# Patient Record
Sex: MALE | Race: BLACK OR AFRICAN AMERICAN | NOT HISPANIC OR LATINO | Employment: FULL TIME | RURAL
[De-identification: names, ages, dates, MRNs, and addresses within clinical notes are randomized per-mention and may not be internally consistent; named-entity substitution may affect disease eponyms.]

---

## 2022-06-01 ENCOUNTER — HOSPITAL ENCOUNTER (EMERGENCY)
Facility: HOSPITAL | Age: 22
Discharge: HOME | End: 2022-06-01
Payer: MEDICAID

## 2022-06-01 VITALS
BODY MASS INDEX: 20.9 KG/M2 | HEART RATE: 62 BPM | TEMPERATURE: 98 F | WEIGHT: 146 LBS | DIASTOLIC BLOOD PRESSURE: 67 MMHG | RESPIRATION RATE: 18 BRPM | HEIGHT: 70 IN | SYSTOLIC BLOOD PRESSURE: 128 MMHG | OXYGEN SATURATION: 100 %

## 2022-06-01 DIAGNOSIS — R51.9 ACUTE NONINTRACTABLE HEADACHE, UNSPECIFIED HEADACHE TYPE: Primary | ICD-10-CM

## 2022-06-01 PROCEDURE — 96372 THER/PROPH/DIAG INJ SC/IM: CPT

## 2022-06-01 PROCEDURE — 99284 EMERGENCY DEPT VISIT MOD MDM: CPT

## 2022-06-01 PROCEDURE — 99283 EMERGENCY DEPT VISIT LOW MDM: CPT | Mod: ,,, | Performed by: NURSE PRACTITIONER

## 2022-06-01 PROCEDURE — 99283 PR EMERGENCY DEPT VISIT,LEVEL III: ICD-10-PCS | Mod: ,,, | Performed by: NURSE PRACTITIONER

## 2022-06-01 PROCEDURE — 63600175 PHARM REV CODE 636 W HCPCS: Performed by: NURSE PRACTITIONER

## 2022-06-01 RX ORDER — KETOROLAC TROMETHAMINE 30 MG/ML
30 INJECTION, SOLUTION INTRAMUSCULAR; INTRAVENOUS
Status: COMPLETED | OUTPATIENT
Start: 2022-06-01 | End: 2022-06-01

## 2022-06-01 RX ADMIN — KETOROLAC TROMETHAMINE 30 MG: 30 INJECTION, SOLUTION INTRAMUSCULAR at 06:06

## 2022-06-01 NOTE — DISCHARGE INSTRUCTIONS
Follow up with primary care provider in 1-2 days.   Ibuprofen 600 mg every 8 hours as needed for headache/pain.  Tylenol 1-2 tablets every 4-6 hours as needed for pain.  Return to emergency department for any new or worsening symptoms.

## 2022-06-01 NOTE — ED TRIAGE NOTES
Presents to ed with friend c/o headache since yesterday stated it has gotten worse today..Has not taken any otc medication.Rates 8 on 10 pain scale.

## 2022-06-01 NOTE — ED PROVIDER NOTES
Encounter Date: 6/1/2022       History     Chief Complaint   Patient presents with    Headache     Tejas Vizcarra is a 21 y.o. Black or  /male presenting to ED with headache for 2 days. Reports that this is a typical headache for him. Pain is bilateral, above eyes. Took tylenol yesterday with some relief but has not taken any OTC meds for headache today. Denies visual disturbances. Denies N/V, fever. Denies head injury or recent trauma. Currently in NAD. VSS at this time.         Review of patient's allergies indicates:  No Known Allergies  History reviewed. No pertinent past medical history.  History reviewed. No pertinent surgical history.  History reviewed. No pertinent family history.  Social History     Tobacco Use    Smoking status: Current Every Day Smoker     Packs/day: 0.50     Types: Cigarettes    Smokeless tobacco: Never Used   Substance Use Topics    Alcohol use: Not Currently    Drug use: Not Currently     Review of Systems   Constitutional: Negative for activity change, chills, fatigue and fever.   HENT: Negative for congestion, ear discharge, rhinorrhea, sinus pressure, sinus pain and sore throat.    Eyes: Negative for photophobia, pain and visual disturbance.   Gastrointestinal: Negative for nausea and vomiting.   Musculoskeletal: Negative for neck pain and neck stiffness.   Neurological: Positive for headaches. Negative for dizziness, syncope, facial asymmetry, speech difficulty, weakness, light-headedness and numbness.   Psychiatric/Behavioral: Negative for confusion.   All other systems reviewed and are negative.      Physical Exam     Initial Vitals [06/01/22 1842]   BP Pulse Resp Temp SpO2   128/67 62 18 98.3 °F (36.8 °C) 100 %      MAP       --         Physical Exam    Nursing note and vitals reviewed.  Constitutional: He appears well-developed and well-nourished. No distress.   HENT:   Head: Normocephalic and atraumatic.   Nose: Nose normal.   Mouth/Throat:  Oropharynx is clear and moist.   Eyes: Conjunctivae and EOM are normal. Pupils are equal, round, and reactive to light. No scleral icterus.   Neck: Neck supple.   Normal range of motion.  Cardiovascular: Normal rate and regular rhythm.   Pulmonary/Chest: Breath sounds normal. No respiratory distress.   Musculoskeletal:         General: No tenderness. Normal range of motion.      Cervical back: Normal range of motion and neck supple.     Lymphadenopathy:     He has no cervical adenopathy.   Neurological: He is alert and oriented to person, place, and time. He has normal strength.   Skin: Skin is warm and dry.   Psychiatric: He has a normal mood and affect.         Medical Screening Exam   See Full Note    ED Course   Procedures  Labs Reviewed - No data to display       Imaging Results    None          Medications   ketorolac injection 30 mg (30 mg Intramuscular Given 6/1/22 1850)                 ED Course as of 06/01/22 1918 Wed Jun 01, 2022 1915 Reports headache is much better.   [CG]      ED Course User Index  [CG] ISMAEL Hanks          Clinical Impression:   Final diagnoses:  [R51.9] Acute nonintractable headache, unspecified headache type (Primary)          ED Disposition Condition    Discharge Stable        ED Prescriptions     None        Follow-up Information    None          ISMAEL Hanks  06/01/22 1918

## 2022-06-18 ENCOUNTER — HOSPITAL ENCOUNTER (EMERGENCY)
Facility: HOSPITAL | Age: 22
Discharge: HOME | End: 2022-06-18
Payer: MEDICAID

## 2022-06-18 VITALS
RESPIRATION RATE: 14 BRPM | SYSTOLIC BLOOD PRESSURE: 128 MMHG | HEART RATE: 69 BPM | WEIGHT: 137.38 LBS | TEMPERATURE: 99 F | HEIGHT: 70 IN | BODY MASS INDEX: 19.67 KG/M2 | DIASTOLIC BLOOD PRESSURE: 80 MMHG | OXYGEN SATURATION: 99 %

## 2022-06-18 DIAGNOSIS — S02.5XXB OPEN FRACTURE OF TOOTH, INITIAL ENCOUNTER: ICD-10-CM

## 2022-06-18 DIAGNOSIS — K04.01 ACUTE PULPITIS: Primary | ICD-10-CM

## 2022-06-18 DIAGNOSIS — K08.89 PAIN, DENTAL: ICD-10-CM

## 2022-06-18 DIAGNOSIS — R03.0 ELEVATED BLOOD PRESSURE READING IN OFFICE WITHOUT DIAGNOSIS OF HYPERTENSION: ICD-10-CM

## 2022-06-18 PROCEDURE — 99284 EMERGENCY DEPT VISIT MOD MDM: CPT

## 2022-06-18 PROCEDURE — 99283 EMERGENCY DEPT VISIT LOW MDM: CPT | Mod: ,,, | Performed by: NURSE PRACTITIONER

## 2022-06-18 PROCEDURE — 96372 THER/PROPH/DIAG INJ SC/IM: CPT

## 2022-06-18 PROCEDURE — 63600175 PHARM REV CODE 636 W HCPCS: Performed by: NURSE PRACTITIONER

## 2022-06-18 PROCEDURE — 99283 PR EMERGENCY DEPT VISIT,LEVEL III: ICD-10-PCS | Mod: ,,, | Performed by: NURSE PRACTITIONER

## 2022-06-18 RX ORDER — PENICILLIN V POTASSIUM 500 MG/1
500 TABLET, FILM COATED ORAL EVERY 8 HOURS
Qty: 21 TABLET | Refills: 0 | Status: SHIPPED | OUTPATIENT
Start: 2022-06-18 | End: 2022-06-25

## 2022-06-18 RX ORDER — NAPROXEN 500 MG/1
500 TABLET ORAL 2 TIMES DAILY PRN
Qty: 30 TABLET | Refills: 0 | OUTPATIENT
Start: 2022-06-18 | End: 2022-08-29

## 2022-06-18 RX ORDER — KETOROLAC TROMETHAMINE 30 MG/ML
30 INJECTION, SOLUTION INTRAMUSCULAR; INTRAVENOUS
Status: COMPLETED | OUTPATIENT
Start: 2022-06-18 | End: 2022-06-18

## 2022-06-18 RX ORDER — CEFTRIAXONE 1 G/1
1 INJECTION, POWDER, FOR SOLUTION INTRAMUSCULAR; INTRAVENOUS
Status: COMPLETED | OUTPATIENT
Start: 2022-06-18 | End: 2022-06-18

## 2022-06-18 RX ADMIN — CEFTRIAXONE SODIUM 1 G: 1 INJECTION, POWDER, FOR SOLUTION INTRAMUSCULAR; INTRAVENOUS at 07:06

## 2022-06-18 RX ADMIN — KETOROLAC TROMETHAMINE 30 MG: 30 INJECTION, SOLUTION INTRAMUSCULAR at 07:06

## 2022-06-19 NOTE — ED TRIAGE NOTES
Patient came in c/o left lower jaw tooth pain that he has had a while but has increased over past 2 days. Patient denies any trauma. Patient has not seen a dentist. Patient has not been on any antibiotics. Patient's friend brought him and and is present at bedside.

## 2022-06-19 NOTE — ED PROVIDER NOTES
Encounter Date: 6/18/2022       History     Chief Complaint   Patient presents with    Dental Pain     Tejas Vizcarra is a 22 yo AAM who presents with left lower dental pain. Chronic but tooth has started breaking into pieces and pain is getting worse        Review of patient's allergies indicates:  No Known Allergies  History reviewed. No pertinent past medical history.  History reviewed. No pertinent surgical history.  History reviewed. No pertinent family history.  Social History     Tobacco Use    Smoking status: Current Every Day Smoker     Packs/day: 0.50     Types: Cigarettes    Smokeless tobacco: Never Used   Substance Use Topics    Alcohol use: Not Currently    Drug use: Not Currently     Review of Systems   Constitutional: Negative for chills and fever.   HENT: Positive for dental problem and facial swelling. Negative for ear pain, hearing loss, sinus pressure and sinus pain.    Respiratory: Negative for cough, shortness of breath and wheezing.    Neurological: Negative for dizziness and headaches.   Psychiatric/Behavioral: Negative for confusion.   All other systems reviewed and are negative.      Physical Exam     Initial Vitals [06/18/22 1940]   BP Pulse Resp Temp SpO2   132/87 64 12 98.6 °F (37 °C) 99 %      MAP       --         Physical Exam    Nursing note and vitals reviewed.  Constitutional: He appears well-developed and well-nourished.   HENT:   Head: Normocephalic and atraumatic.   Right Ear: External ear normal.   Left Ear: External ear normal.   Nose: Nose normal.   Mouth/Throat: Oropharynx is clear and moist. Dental abscesses and dental caries present.       Eyes: EOM are normal. Pupils are equal, round, and reactive to light.   Neck: Neck supple.   Normal range of motion.  Cardiovascular: Normal rate, regular rhythm and normal heart sounds.   Pulmonary/Chest: Breath sounds normal.   Musculoskeletal:         General: Normal range of motion.      Cervical back: Normal range of  motion and neck supple.     Neurological: He is alert and oriented to person, place, and time. GCS score is 15. GCS eye subscore is 4. GCS verbal subscore is 5. GCS motor subscore is 6.   Skin: Skin is warm.   Psychiatric: He has a normal mood and affect. His behavior is normal. Judgment and thought content normal.         Medical Screening Exam   See Full Note    ED Course   Procedures  Labs Reviewed - No data to display       Imaging Results    None          Medications   cefTRIAXone injection 1 g (has no administration in time range)   ketorolac injection 30 mg (has no administration in time range)                       Clinical Impression:   Final diagnoses:  [K08.89] Pain, dental  [K04.01] Acute pulpitis (Primary)  [S02.5XXB] Open fracture of tooth, initial encounter  [R03.0] Elevated blood pressure reading in office without diagnosis of hypertension          ED Disposition Condition    Discharge Stable        ED Prescriptions     Medication Sig Dispense Start Date End Date Auth. Provider    penicillin v potassium (VEETID) 500 MG tablet Take 1 tablet (500 mg total) by mouth every 8 (eight) hours. for 7 days 21 tablet 6/18/2022 6/25/2022 ISMAEL Mcclendon    naproxen (NAPROSYN) 500 MG tablet Take 1 tablet (500 mg total) by mouth 2 (two) times daily as needed (pain). 30 tablet 6/18/2022  ISMAEL Mcclendon        Follow-up Information    None          ISMAEL Mcclendon  06/18/22 1951

## 2022-06-19 NOTE — DISCHARGE INSTRUCTIONS
Finish your antibiotics, make appointment with dentist asap! Thank you for choosing [facility] for your healthcare needs.

## 2022-08-29 ENCOUNTER — HOSPITAL ENCOUNTER (EMERGENCY)
Facility: HOSPITAL | Age: 22
Discharge: HOME | End: 2022-08-29
Payer: MEDICAID

## 2022-08-29 VITALS
TEMPERATURE: 98 F | WEIGHT: 144.13 LBS | SYSTOLIC BLOOD PRESSURE: 124 MMHG | DIASTOLIC BLOOD PRESSURE: 62 MMHG | BODY MASS INDEX: 20.64 KG/M2 | HEART RATE: 82 BPM | RESPIRATION RATE: 18 BRPM | OXYGEN SATURATION: 100 % | HEIGHT: 70 IN

## 2022-08-29 DIAGNOSIS — U07.1 COVID-19: Primary | ICD-10-CM

## 2022-08-29 LAB
FLUAV AG UPPER RESP QL IA.RAPID: NEGATIVE
FLUBV AG UPPER RESP QL IA.RAPID: NEGATIVE
SARS-COV+SARS-COV-2 AG RESP QL IA.RAPID: POSITIVE

## 2022-08-29 PROCEDURE — 99283 PR EMERGENCY DEPT VISIT,LEVEL III: ICD-10-PCS | Mod: ,,, | Performed by: NURSE PRACTITIONER

## 2022-08-29 PROCEDURE — 99283 EMERGENCY DEPT VISIT LOW MDM: CPT

## 2022-08-29 PROCEDURE — 87428 SARSCOV & INF VIR A&B AG IA: CPT | Performed by: NURSE PRACTITIONER

## 2022-08-29 PROCEDURE — 99283 EMERGENCY DEPT VISIT LOW MDM: CPT | Mod: ,,, | Performed by: NURSE PRACTITIONER

## 2022-08-29 RX ORDER — BENZONATATE 100 MG/1
100 CAPSULE ORAL 3 TIMES DAILY PRN
Qty: 20 CAPSULE | Refills: 0 | Status: SHIPPED | OUTPATIENT
Start: 2022-08-29 | End: 2022-09-08

## 2022-08-29 NOTE — DISCHARGE INSTRUCTIONS
Take medication as directed.  Follow up with your primary care provider or [facility].  Return for shortness of breath or not able to tolerate fluids by mouth.  Other wise stay home on isolation for the next 5 days.  Take tylenol or ibuporfen as directed if needed for fever or body aches

## 2022-08-29 NOTE — ED TRIAGE NOTES
"21 year old male presents to ER with c/o headache, stuffy nose, cough, cold sores in nose x 2 days. Pt states "they made me come from work to be checked".  Skin warm/dry to touch, respirations are even and non labored. Pts VS are WNL with NAD noted at this time.  "

## 2022-08-29 NOTE — ED PROVIDER NOTES
Encounter Date: 8/29/2022       History     Chief Complaint   Patient presents with    Cough    Nausea     21 yr old AAM to ED with c/o cough, nausea, aching all over for the past 2 days.      The history is provided by the patient.   URI  The primary symptoms include cough and nausea. Primary symptoms do not include fever or wheezing. The current episode started two days ago. This is a new problem.   The cough is productive. The sputum is clear.   Nausea began yesterday. Associated with: cough.   Symptoms associated with the illness include congestion.   Review of patient's allergies indicates:  No Known Allergies  History reviewed. No pertinent past medical history.  History reviewed. No pertinent surgical history.  History reviewed. No pertinent family history.  Social History     Tobacco Use    Smoking status: Every Day     Packs/day: 0.50     Types: Cigarettes    Smokeless tobacco: Never   Substance Use Topics    Alcohol use: Not Currently    Drug use: Not Currently     Review of Systems   Constitutional:  Negative for fever.   HENT:  Positive for congestion and postnasal drip.    Respiratory:  Positive for cough. Negative for shortness of breath and wheezing.    Cardiovascular:  Negative for chest pain.   Gastrointestinal:  Positive for nausea.   Genitourinary: Negative.    Musculoskeletal: Negative.    Skin: Negative.      Physical Exam     Initial Vitals [08/29/22 0335]   BP Pulse Resp Temp SpO2   124/62 82 18 98.4 °F (36.9 °C) 100 %      MAP       --         Physical Exam    Nursing note and vitals reviewed.  Constitutional: He appears well-developed and well-nourished.   Neck: Neck supple.   Cardiovascular:  Normal rate and regular rhythm.           Pulmonary/Chest: Breath sounds normal.   Musculoskeletal:      Cervical back: Neck supple.     Neurological: He is alert and oriented to person, place, and time.   Skin: Skin is warm and dry. Capillary refill takes less than 2 seconds.       Medical Screening  Exam   See Full Note    ED Course   Procedures  Labs Reviewed   SARS-COV2 (COVID) W/ FLU ANTIGEN - Abnormal; Notable for the following components:       Result Value    COVID-19 Ag Positive (*)     All other components within normal limits    Narrative:     Positive SARS-CoV antigen results indicate the presence of viral antigens; correlation with patient history and presence of clinical signs & symptoms consistent with COVID-19 are necessary to determine infection status.          Imaging Results    None          Medications - No data to display              ED Course as of 08/29/22 0414   Mon Aug 29, 2022   0413 Discussed findings, return precautions and f/u with patient [CG]      ED Course User Index  [CG] ISMAEL Hanks          Clinical Impression:   Final diagnoses:  [U07.1] COVID-19 (Primary)      ED Disposition Condition    Discharge Stable          ED Prescriptions       Medication Sig Dispense Start Date End Date Auth. Provider    benzonatate (TESSALON) 100 MG capsule Take 1 capsule (100 mg total) by mouth 3 (three) times daily as needed for Cough. 20 capsule 8/29/2022 9/8/2022 ISMAEL Hanks          Follow-up Information       Follow up With Specialties Details Why Contact Info    [facility]   [address] [place] [address] [place] [id number] [phone]               ISMAEL Hanks  08/29/22 0414

## 2022-08-29 NOTE — Clinical Note
"Liquandetricus "Girishandetric" Zackery was seen and treated in our emergency department on 8/29/2022.  He may return to work on 09/04/2022.       If you have any questions or concerns, please don't hesitate to call.      ISMAEL Hanks"

## 2023-01-06 ENCOUNTER — HOSPITAL ENCOUNTER (EMERGENCY)
Facility: HOSPITAL | Age: 23
Discharge: HOME | End: 2023-01-06
Payer: MEDICAID

## 2023-01-06 VITALS
TEMPERATURE: 98 F | OXYGEN SATURATION: 100 % | DIASTOLIC BLOOD PRESSURE: 64 MMHG | RESPIRATION RATE: 18 BRPM | HEART RATE: 61 BPM | SYSTOLIC BLOOD PRESSURE: 115 MMHG | HEIGHT: 70 IN | WEIGHT: 146 LBS | BODY MASS INDEX: 20.9 KG/M2

## 2023-01-06 DIAGNOSIS — T78.1XXA ALLERGIC REACTION TO FOOD, INITIAL ENCOUNTER: Primary | ICD-10-CM

## 2023-01-06 DIAGNOSIS — R21 RASH: ICD-10-CM

## 2023-01-06 PROCEDURE — 99283 PR EMERGENCY DEPT VISIT,LEVEL III: ICD-10-PCS | Mod: ,,, | Performed by: NURSE PRACTITIONER

## 2023-01-06 PROCEDURE — 99283 EMERGENCY DEPT VISIT LOW MDM: CPT | Mod: ,,, | Performed by: NURSE PRACTITIONER

## 2023-01-06 PROCEDURE — 96372 THER/PROPH/DIAG INJ SC/IM: CPT | Performed by: NURSE PRACTITIONER

## 2023-01-06 PROCEDURE — 63600175 PHARM REV CODE 636 W HCPCS: Performed by: NURSE PRACTITIONER

## 2023-01-06 PROCEDURE — 99284 EMERGENCY DEPT VISIT MOD MDM: CPT

## 2023-01-06 RX ORDER — DEXAMETHASONE SODIUM PHOSPHATE 4 MG/ML
8 INJECTION, SOLUTION INTRA-ARTICULAR; INTRALESIONAL; INTRAMUSCULAR; INTRAVENOUS; SOFT TISSUE
Status: COMPLETED | OUTPATIENT
Start: 2023-01-06 | End: 2023-01-06

## 2023-01-06 RX ORDER — CETIRIZINE HYDROCHLORIDE 10 MG/1
10 TABLET ORAL DAILY
Qty: 30 TABLET | Refills: 0 | Status: SHIPPED | OUTPATIENT
Start: 2023-01-06 | End: 2024-01-06

## 2023-01-06 RX ORDER — PREDNISONE 20 MG/1
40 TABLET ORAL DAILY
Qty: 6 TABLET | Refills: 0 | Status: SHIPPED | OUTPATIENT
Start: 2023-01-06 | End: 2023-01-09

## 2023-01-06 RX ADMIN — DEXAMETHASONE SODIUM PHOSPHATE 8 MG: 4 INJECTION, SOLUTION INTRA-ARTICULAR; INTRALESIONAL; INTRAMUSCULAR; INTRAVENOUS; SOFT TISSUE at 07:01

## 2023-01-06 NOTE — ED PROVIDER NOTES
Encounter Date: 1/6/2023       History     Chief Complaint   Patient presents with    Allergic Reaction     23 y/o AAM presents pov with c/o an allergic reaction to seafood after eating fish and seafood yesterday at 14:00. Patient c/o rash and tingling around his mouth.    Review of patient's allergies indicates:   Allergen Reactions    Iodine Swelling    Shrimp      History reviewed. No pertinent past medical history.  History reviewed. No pertinent surgical history.  History reviewed. No pertinent family history.  Social History     Tobacco Use    Smoking status: Every Day     Packs/day: 0.50     Types: Cigarettes    Smokeless tobacco: Never   Substance Use Topics    Alcohol use: Not Currently    Drug use: Not Currently     Review of Systems   HENT:  Negative for facial swelling, rhinorrhea, sneezing, sore throat and trouble swallowing.    Eyes:  Negative for redness, itching and visual disturbance.   Respiratory:  Negative for apnea, cough, choking, chest tightness, shortness of breath, wheezing and stridor.    Cardiovascular:  Negative for chest pain, palpitations and leg swelling.   Gastrointestinal:  Negative for abdominal pain, nausea and vomiting.   Allergic/Immunologic: Positive for food allergies.   All other systems reviewed and are negative.    Physical Exam     Initial Vitals [01/06/23 0713]   BP Pulse Resp Temp SpO2   115/64 61 18 98.1 °F (36.7 °C) 100 %      MAP       --         Physical Exam    Nursing note and vitals reviewed.  Constitutional: He appears well-developed and well-nourished. No distress.   HENT:   Head: Normocephalic.   Right Ear: Tympanic membrane and ear canal normal.   Left Ear: Tympanic membrane and ear canal normal.   Nose: Nose normal. No mucosal edema. Right sinus exhibits no maxillary sinus tenderness and no frontal sinus tenderness. Left sinus exhibits no maxillary sinus tenderness and no frontal sinus tenderness.   Mouth/Throat: Uvula is midline, oropharynx is clear and moist  and mucous membranes are normal. No uvula swelling.   Eyes: Conjunctivae and EOM are normal.   Neck: Neck supple.   Normal range of motion.  Cardiovascular:  Normal rate, regular rhythm, normal heart sounds and intact distal pulses.     Exam reveals no gallop and no friction rub.       No murmur heard.  Pulmonary/Chest: Breath sounds normal. No respiratory distress. He has no wheezes. He has no rhonchi. He has no rales. He exhibits no tenderness.   Abdominal: Abdomen is soft. Bowel sounds are normal. There is no abdominal tenderness.   Musculoskeletal:         General: Normal range of motion.      Cervical back: Normal range of motion and neck supple.     Lymphadenopathy:     He has no cervical adenopathy.   Neurological: He is alert and oriented to person, place, and time. He has normal strength.   Skin: Skin is warm and dry. Capillary refill takes less than 2 seconds.   Psychiatric: He has a normal mood and affect. His behavior is normal. Judgment and thought content normal.       Medical Screening Exam   See Full Note    ED Course   Procedures  Labs Reviewed - No data to display       Imaging Results    None          Medications   dexAMETHasone injection 8 mg (8 mg Intramuscular Given 1/6/23 0735)     Medical Decision Making:   Initial Assessment:   21 y/o AAM presents pov with c/o an allergic reaction to seafood after eating fish and seafood yesterday at 14:00. Patient c/o rash and tingling around his mouth. Heart sounds normal with regular rate/rhythm. Lungs clear to auscultation. Resp even/unlabored. Skin w/d. Mild erythema around mouth.  Differential Diagnosis:   Anaphylactic Reaction  Allergic Reaction  Skin Rash  ED Management:  Patient supine on stretcher in ED # 1 in [facility]. IM steroid given. Patient discharged home. Return precautions and f/u instructions given.                 Clinical Impression:   Final diagnoses:  [T78.1XXA] Allergic reaction to food, initial encounter (Primary)  [R21] Rash        ED  Disposition Condition    Discharge Stable          ED Prescriptions       Medication Sig Dispense Start Date End Date Auth. Provider    predniSONE (DELTASONE) 20 MG tablet Take 2 tablets (40 mg total) by mouth once daily. for 3 days 6 tablet 1/6/2023 1/9/2023 ISMAEL Vieyra    cetirizine (ZYRTEC) 10 MG tablet Take 1 tablet (10 mg total) by mouth once daily. 30 tablet 1/6/2023 1/6/2024 ISMAEL Vieyra          Follow-up Information    None          ISMAEL Vieyra  01/06/23 0750

## 2023-01-06 NOTE — ED TRIAGE NOTES
Pt presents w/ c/o allergy reaction to seafood that he ate at 2 pm yesterday, pt c/o of throat itchiness and skin reaction around his mouth. Pt states he took benadryl yesterday.

## 2023-02-12 ENCOUNTER — HOSPITAL ENCOUNTER (EMERGENCY)
Facility: HOSPITAL | Age: 23
Discharge: HOME | End: 2023-02-12
Payer: MEDICAID

## 2023-02-12 VITALS
DIASTOLIC BLOOD PRESSURE: 71 MMHG | TEMPERATURE: 100 F | BODY MASS INDEX: 22.9 KG/M2 | SYSTOLIC BLOOD PRESSURE: 118 MMHG | OXYGEN SATURATION: 98 % | HEIGHT: 70 IN | RESPIRATION RATE: 18 BRPM | HEART RATE: 93 BPM | WEIGHT: 160 LBS

## 2023-02-12 DIAGNOSIS — K52.9 GASTROENTERITIS: ICD-10-CM

## 2023-02-12 DIAGNOSIS — R11.2 NAUSEA AND VOMITING, UNSPECIFIED VOMITING TYPE: Primary | ICD-10-CM

## 2023-02-12 DIAGNOSIS — R11.2 NAUSEA & VOMITING: ICD-10-CM

## 2023-02-12 LAB
BASOPHILS # BLD AUTO: 0.02 K/UL (ref 0–0.2)
BASOPHILS NFR BLD AUTO: 0.2 % (ref 0–1)
DIFFERENTIAL METHOD BLD: ABNORMAL
EOSINOPHIL # BLD AUTO: 0.04 K/UL (ref 0–0.5)
EOSINOPHIL NFR BLD AUTO: 0.4 % (ref 1–4)
ERYTHROCYTE [DISTWIDTH] IN BLOOD BY AUTOMATED COUNT: 11.2 % (ref 11.5–14.5)
FLUAV AG UPPER RESP QL IA.RAPID: NEGATIVE
FLUBV AG UPPER RESP QL IA.RAPID: NEGATIVE
HCT VFR BLD AUTO: 43.8 % (ref 40–54)
HGB BLD-MCNC: 15.5 G/DL (ref 13.5–18)
LYMPHOCYTES # BLD AUTO: 1.34 K/UL (ref 1–4.8)
LYMPHOCYTES NFR BLD AUTO: 13.2 % (ref 27–41)
MCH RBC QN AUTO: 33 PG (ref 27–31)
MCHC RBC AUTO-ENTMCNC: 35.4 G/DL (ref 32–36)
MCV RBC AUTO: 93.2 FL (ref 80–96)
MONOCYTES # BLD AUTO: 1.03 K/UL (ref 0–0.8)
MONOCYTES NFR BLD AUTO: 10.1 % (ref 2–6)
MPC BLD CALC-MCNC: 10.7 FL (ref 9.4–12.4)
NEUTROPHILS # BLD AUTO: 7.75 K/UL (ref 1.8–7.7)
NEUTROPHILS NFR BLD AUTO: 76.1 % (ref 53–65)
PLATELET # BLD AUTO: 168 K/UL (ref 150–400)
RBC # BLD AUTO: 4.7 M/UL (ref 4.6–6.2)
SARS-COV+SARS-COV-2 AG RESP QL IA.RAPID: NEGATIVE
WBC # BLD AUTO: 10.18 K/UL (ref 4.5–11)

## 2023-02-12 PROCEDURE — 87428 SARSCOV & INF VIR A&B AG IA: CPT

## 2023-02-12 PROCEDURE — 63600175 PHARM REV CODE 636 W HCPCS

## 2023-02-12 PROCEDURE — 99284 EMERGENCY DEPT VISIT MOD MDM: CPT

## 2023-02-12 PROCEDURE — 25000003 PHARM REV CODE 250

## 2023-02-12 PROCEDURE — 99284 PR EMERGENCY DEPT VISIT,LEVEL IV: ICD-10-PCS | Mod: ,,,

## 2023-02-12 PROCEDURE — 99284 EMERGENCY DEPT VISIT MOD MDM: CPT | Mod: ,,,

## 2023-02-12 PROCEDURE — 96374 THER/PROPH/DIAG INJ IV PUSH: CPT

## 2023-02-12 PROCEDURE — 85025 COMPLETE CBC W/AUTO DIFF WBC: CPT

## 2023-02-12 PROCEDURE — 96361 HYDRATE IV INFUSION ADD-ON: CPT

## 2023-02-12 RX ORDER — ONDANSETRON 4 MG/1
4 TABLET, FILM COATED ORAL EVERY 6 HOURS
Qty: 12 TABLET | Refills: 0 | Status: SHIPPED | OUTPATIENT
Start: 2023-02-12

## 2023-02-12 RX ORDER — ONDANSETRON 2 MG/ML
4 INJECTION INTRAMUSCULAR; INTRAVENOUS
Status: COMPLETED | OUTPATIENT
Start: 2023-02-12 | End: 2023-02-12

## 2023-02-12 RX ORDER — POLYETHYLENE GLYCOL 3350 17 G/17G
17 POWDER, FOR SOLUTION ORAL DAILY
Qty: 235 G | Refills: 0 | Status: SHIPPED | OUTPATIENT
Start: 2023-02-12

## 2023-02-12 RX ADMIN — ONDANSETRON 4 MG: 2 INJECTION INTRAMUSCULAR; INTRAVENOUS at 03:02

## 2023-02-12 RX ADMIN — SODIUM CHLORIDE 1000 ML: 9 INJECTION, SOLUTION INTRAVENOUS at 03:02

## 2023-02-12 NOTE — Clinical Note
"Liquandetricus "Liquandetric" Zackery was seen and treated in our emergency department on 2/12/2023.  He may return to work on 02/13/2023.       If you have any questions or concerns, please don't hesitate to call.      ISMAEL Srinivasan"

## 2023-02-12 NOTE — ED PROVIDER NOTES
Encounter Date: 2/12/2023       History     Chief Complaint   Patient presents with    Nausea    Vomiting     Since yesterday. States unable to tolerate PO intake. No PMH.      Patient is a 22-year-old  male who presents to the ER POV with complaint of nausea and weakness.  Patient stated that his nausea weakness started last night, since that time he is had several instances of vomiting and diarrhea.  Patient denies any fevers at home, chest pain, shortness of breath and or exposure to ill contacts.    The history is provided by the patient.   Nausea  This is a new problem. The current episode started yesterday. The problem occurs hourly. The problem has not changed since onset.Pertinent negatives include no chest pain, no abdominal pain, no headaches and no shortness of breath. The symptoms are aggravated by eating. Nothing relieves the symptoms. He has tried nothing for the symptoms. The treatment provided no relief.   Emesis   Associated symptoms include diarrhea. Pertinent negatives include no abdominal pain and no headaches.   Review of patient's allergies indicates:   Allergen Reactions    Iodine Swelling    Shrimp      History reviewed. No pertinent past medical history.  History reviewed. No pertinent surgical history.  History reviewed. No pertinent family history.  Social History     Tobacco Use    Smoking status: Every Day     Packs/day: 0.50     Types: Cigarettes    Smokeless tobacco: Never   Substance Use Topics    Alcohol use: Not Currently    Drug use: Not Currently     Review of Systems   Constitutional: Negative.    HENT: Negative.     Eyes: Negative.    Respiratory:  Negative for shortness of breath.    Cardiovascular:  Negative for chest pain.   Gastrointestinal:  Positive for diarrhea, nausea and vomiting. Negative for abdominal pain.   Endocrine: Negative.    Genitourinary: Negative.    Musculoskeletal: Negative.    Skin: Negative.    Allergic/Immunologic: Negative.     Neurological:  Negative for headaches.   Hematological: Negative.    Psychiatric/Behavioral: Negative.       Physical Exam     Initial Vitals [02/12/23 1506]   BP Pulse Resp Temp SpO2   118/71 93 18 99.9 °F (37.7 °C) 98 %      MAP       --         Physical Exam    Nursing note and vitals reviewed.  Constitutional: Vital signs are normal. He appears well-developed and well-nourished. He is not diaphoretic. He is cooperative.  Non-toxic appearance. He does not have a sickly appearance. He does not appear ill. No distress.   HENT:   Head: Normocephalic and atraumatic.   Right Ear: Hearing, tympanic membrane, external ear and ear canal normal.   Left Ear: Hearing, tympanic membrane, external ear and ear canal normal.   Nose: Nose normal. Right sinus exhibits no maxillary sinus tenderness and no frontal sinus tenderness. Left sinus exhibits no maxillary sinus tenderness and no frontal sinus tenderness.   Mouth/Throat: Uvula is midline, oropharynx is clear and moist and mucous membranes are normal.   Eyes: Conjunctivae, EOM and lids are normal. Pupils are equal, round, and reactive to light.   Neck: Trachea normal and phonation normal. Neck supple. No thyroid mass and no thyromegaly present.   Normal range of motion.   Full passive range of motion without pain.     Cardiovascular:  Normal rate, regular rhythm, S1 normal, S2 normal, normal heart sounds, intact distal pulses and normal pulses.     Exam reveals no gallop, no S3, no S4, no distant heart sounds and no friction rub.       No murmur heard.  No systolic murmur is present.  Pulmonary/Chest: Effort normal and breath sounds normal.   Abdominal: Abdomen is soft and flat. Bowel sounds are normal. There is no abdominal tenderness. No hernia.   Musculoskeletal:      Cervical back: Full passive range of motion without pain, normal range of motion and neck supple.     Lymphadenopathy:     He has no cervical adenopathy.     He has no axillary adenopathy.   Neurological:  He is alert and oriented to person, place, and time. He has normal strength. No cranial nerve deficit or sensory deficit. He displays a negative Romberg sign. GCS eye subscore is 4. GCS verbal subscore is 5. GCS motor subscore is 6.   Skin: Skin is warm, dry and intact. Capillary refill takes less than 2 seconds. No rash noted.   Psychiatric: He has a normal mood and affect. His speech is normal and behavior is normal. Judgment and thought content normal. Cognition and memory are normal.       Medical Screening Exam   See Full Note    ED Course   Procedures  Labs Reviewed   CBC WITH DIFFERENTIAL - Abnormal; Notable for the following components:       Result Value    MCH 33.0 (*)     RDW 11.2 (*)     Neutrophils % 76.1 (*)     Lymphocytes % 13.2 (*)     Neutrophils, Abs 7.75 (*)     Monocytes % 10.1 (*)     Eosinophils % 0.4 (*)     Monocytes, Absolute 1.03 (*)     All other components within normal limits   SARS-COV2 (COVID) W/ FLU ANTIGEN - Normal    Narrative:     Negative SARS-CoV results should not be used as the sole basis for treatment or patient management decisions; negative results should be considered in the context of a patient's recent exposures, history and the presene of clinical signs and symptoms consistent with COVID-19.  Negative results should be treated as presumptive and confirmed by molecular assay, if necessary for patient management.   CBC W/ AUTO DIFFERENTIAL    Narrative:     The following orders were created for panel order CBC auto differential.  Procedure                               Abnormality         Status                     ---------                               -----------         ------                     CBC with Differential[[id number]]        Abnormal            Final result                 Please view results for these tests on the individual orders.          Imaging Results              X-Ray Abdomen AP 1 View (KUB) (Final result)  Result time 02/12/23 15:56:02      Final  result by Joe Patrick DO (02/12/23 15:56:02)                   Impression:      No bowel obstruction or renal calculi.  Mild colonic stool      Electronically signed by: Joe Patrick  Date:    02/12/2023  Time:    15:56               Narrative:    EXAMINATION:  XR ABDOMEN AP 1 VIEW    CLINICAL HISTORY:  Nausea with vomiting, unspecified    TECHNIQUE:  XR ABDOMEN AP 1 VIEW    COMPARISON:  None    FINDINGS:  Lower lobes are clear    There is no bowel obstruction.  No free air.  No renal calculi.    Liver and renal silhouettes are normal.    No acute bone findings.                                    X-Rays:   Independently Interpreted Readings:   Other Readings:  Reading Physician Reading Date Result Priority  Joe Patrick DO  [phone] 2/12/2023 STAT    Narrative & Impression  EXAMINATION:  XR ABDOMEN AP 1 VIEW     CLINICAL HISTORY:  Nausea with vomiting, unspecified     TECHNIQUE:  XR ABDOMEN AP 1 VIEW     COMPARISON:  None     FINDINGS:  Lower lobes are clear     There is no bowel obstruction.  No free air.  No renal calculi.     Liver and renal silhouettes are normal.     No acute bone findings.     Impression:     No bowel obstruction or renal calculi.  Mild colonic stool        Electronically signed by: Joe Patrick  Date:                                            02/12/2023  Time:                                           15:56    Medications   sodium chloride 0.9% bolus 1,000 mL 1,000 mL (1,000 mLs Intravenous New Bag 2/12/23 1548)   ondansetron injection 4 mg (4 mg Intravenous Given 2/12/23 1547)     Medical Decision Making:   Initial Assessment:   Patient is a 22-year-old  male who presents to the ER POV with complaint of nausea and weakness.  Patient stated that his nausea weakness started last night, since that time he is had several instances of vomiting and diarrhea.  Patient denies any fevers at home, chest pain, shortness of breath and or exposure to ill  contacts.    The history is provided by the patient.   Nausea  This is a new problem. The current episode started yesterday. The problem occurs hourly. The problem has not changed since onset.Pertinent negatives include no chest pain, no abdominal pain, no headaches and no shortness of breath. The symptoms are aggravated by eating. Nothing relieves the symptoms. He has tried nothing for the symptoms. The treatment provided no relief.   Vomiting   Associated symptoms include diarrhea. Pertinent negatives include no abdominal pain and no headaches.     Physical Exam    Nursing note and vitals reviewed.  Constitutional: Vital signs are normal. He appears well-developed and well-nourished. He is not diaphoretic. He is cooperative.  Non-toxic appearance. He does not have a sickly appearance. He does not appear ill. No distress.   HENT:   Head: Normocephalic and atraumatic.   Right Ear: Hearing, tympanic membrane, external ear and ear canal normal.   Left Ear: Hearing, tympanic membrane, external ear and ear canal normal.   Nose: Nose normal. Right sinus exhibits no maxillary sinus tenderness and no frontal sinus tenderness. Left sinus exhibits no maxillary sinus tenderness and no frontal sinus tenderness.   Mouth/Throat: Uvula is midline, oropharynx is clear and moist and mucous membranes are normal.   Eyes: Conjunctivae, EOM and lids are normal. Pupils are equal, round, and reactive to light.   Neck: Trachea normal and phonation normal. Neck supple. No thyroid mass and no thyromegaly present.   Normal range of motion.   Full passive range of motion without pain.     Cardiovascular:  Normal rate, regular rhythm, S1 normal, S2 normal, normal heart sounds, intact distal pulses and normal pulses.     Exam reveals no gallop, no S3, no S4, no distant heart sounds and no friction rub.       No murmur heard.  No systolic murmur is present.  Pulmonary/Chest: Effort normal and breath sounds normal.   Abdominal: Abdomen is soft  and flat. Bowel sounds are normal. There is no abdominal tenderness. No hernia.   Musculoskeletal:      Cervical back: Full passive range of motion without pain, normal range of motion and neck supple.     Lymphadenopathy:     He has no cervical adenopathy.     He has no axillary adenopathy.   Neurological: He is alert and oriented to person, place, and time. He has normal strength. No cranial nerve deficit or sensory deficit. He displays a negative Romberg sign. GCS eye subscore is 4. GCS verbal subscore is 5. GCS motor subscore is 6.   Skin: Skin is warm, dry and intact. Capillary refill takes less than 2 seconds. No rash noted.   Psychiatric: He has a normal mood and affect. His speech is normal and behavior is normal. Judgment and thought content normal. Cognition and memory are normal.     Differential Diagnosis:   Nausea  Vomiting  Gastroenteritis   Clinical Tests:   Lab Tests: Ordered  ED Management:  Saline Lock IV  Zofran 4 mg IVP  Ns Bolus   Rx for Zofran and Miralax  Follow up with PCP as needed             ED Course as of 02/12/23 1604   Sun Feb 12, 2023   1603 Lab results as well as x-ray reports/images reviewed by me and discussed with patient.  Discharge instructions given along with strict return precautions patient verbalizes understanding. [AC]      ED Course User Index  [AC] ISMAEL Srinivasan          Clinical Impression:   Final diagnoses:  [R11.2] Nausea & vomiting  [R11.2] Nausea and vomiting, unspecified vomiting type (Primary)  [K52.9] Gastroenteritis        ED Disposition Condition    Discharge Stable          ED Prescriptions       Medication Sig Dispense Start Date End Date Auth. Provider    ondansetron (ZOFRAN) 4 MG tablet Take 1 tablet (4 mg total) by mouth every 6 (six) hours. 12 tablet 2/12/2023 -- ISMAEL Srinivasan    polyethylene glycol (GLYCOLAX) 17 gram/dose powder Take 17 g by mouth once daily. 235 g 2/12/2023 -- ISMAEL Srinivasan          Follow-up Information        Follow up With Specialties Details Why Contact Info    local pcp                 Evert Amaro, ISMAEL  02/12/23 3395

## 2023-02-12 NOTE — DISCHARGE INSTRUCTIONS
Take medication as directed by the label on the bottle, drink plenty of water, follow up with PCP as needed, return to the ER if you experience any chest pain or shortness of breath.

## 2023-02-13 ENCOUNTER — HOSPITAL ENCOUNTER (EMERGENCY)
Facility: HOSPITAL | Age: 23
Discharge: HOME | End: 2023-02-13
Payer: MEDICAID

## 2023-02-13 VITALS
DIASTOLIC BLOOD PRESSURE: 42 MMHG | BODY MASS INDEX: 19.6 KG/M2 | TEMPERATURE: 100 F | HEIGHT: 71 IN | SYSTOLIC BLOOD PRESSURE: 114 MMHG | OXYGEN SATURATION: 98 % | HEART RATE: 88 BPM | WEIGHT: 140 LBS | RESPIRATION RATE: 18 BRPM

## 2023-02-13 DIAGNOSIS — K59.00 CONSTIPATION, UNSPECIFIED CONSTIPATION TYPE: Primary | ICD-10-CM

## 2023-02-13 LAB
ALBUMIN SERPL BCP-MCNC: 3.8 G/DL (ref 3.5–5)
ALBUMIN/GLOB SERPL: 1 {RATIO}
ALP SERPL-CCNC: 79 U/L (ref 45–115)
ALT SERPL W P-5'-P-CCNC: 17 U/L (ref 16–61)
ANION GAP SERPL CALCULATED.3IONS-SCNC: 11 MMOL/L (ref 7–16)
AST SERPL W P-5'-P-CCNC: 17 U/L (ref 15–37)
BACTERIA #/AREA URNS HPF: ABNORMAL /HPF
BASOPHILS # BLD AUTO: 0.02 K/UL (ref 0–0.2)
BASOPHILS NFR BLD AUTO: 0.2 % (ref 0–1)
BILIRUB SERPL-MCNC: 0.3 MG/DL (ref ?–1.2)
BILIRUB UR QL STRIP: NEGATIVE
BUN SERPL-MCNC: 5 MG/DL (ref 7–18)
BUN/CREAT SERPL: 5 (ref 6–20)
CALCIUM SERPL-MCNC: 8.8 MG/DL (ref 8.5–10.1)
CHLORIDE SERPL-SCNC: 102 MMOL/L (ref 98–107)
CLARITY UR: CLEAR
CO2 SERPL-SCNC: 27 MMOL/L (ref 21–32)
COLOR UR: YELLOW
CREAT SERPL-MCNC: 1.07 MG/DL (ref 0.7–1.3)
DIFFERENTIAL METHOD BLD: ABNORMAL
EGFR (NO RACE VARIABLE) (RUSH/TITUS): 102 ML/MIN/1.73M²
EOSINOPHIL # BLD AUTO: 0.04 K/UL (ref 0–0.5)
EOSINOPHIL NFR BLD AUTO: 0.4 % (ref 1–4)
ERYTHROCYTE [DISTWIDTH] IN BLOOD BY AUTOMATED COUNT: 11.2 % (ref 11.5–14.5)
GLOBULIN SER-MCNC: 4 G/DL (ref 2–4)
GLUCOSE SERPL-MCNC: 87 MG/DL (ref 74–106)
GLUCOSE UR STRIP-MCNC: NEGATIVE MG/DL
HCT VFR BLD AUTO: 39.7 % (ref 40–54)
HGB BLD-MCNC: 14 G/DL (ref 13.5–18)
KETONES UR STRIP-SCNC: 40 MG/DL
LACTATE SERPL-SCNC: 1.6 MMOL/L (ref 0.4–2)
LEUKOCYTE ESTERASE UR QL STRIP: NEGATIVE
LIPASE SERPL-CCNC: 48 U/L (ref 73–393)
LYMPHOCYTES # BLD AUTO: 0.94 K/UL (ref 1–4.8)
LYMPHOCYTES NFR BLD AUTO: 8.6 % (ref 27–41)
MCH RBC QN AUTO: 32.7 PG (ref 27–31)
MCHC RBC AUTO-ENTMCNC: 35.3 G/DL (ref 32–36)
MCV RBC AUTO: 92.8 FL (ref 80–96)
MONOCYTES # BLD AUTO: 1.07 K/UL (ref 0–0.8)
MONOCYTES NFR BLD AUTO: 9.8 % (ref 2–6)
MPC BLD CALC-MCNC: 10.7 FL (ref 9.4–12.4)
NEUTROPHILS # BLD AUTO: 8.86 K/UL (ref 1.8–7.7)
NEUTROPHILS NFR BLD AUTO: 81 % (ref 53–65)
NITRITE UR QL STRIP: NEGATIVE
PH UR STRIP: 6.5 PH UNITS
PLATELET # BLD AUTO: 161 K/UL (ref 150–400)
POTASSIUM SERPL-SCNC: 3.4 MMOL/L (ref 3.5–5.1)
PROT SERPL-MCNC: 7.8 G/DL (ref 6.4–8.2)
PROT UR QL STRIP: NEGATIVE
RBC # BLD AUTO: 4.28 M/UL (ref 4.6–6.2)
RBC # UR STRIP: ABNORMAL /UL
RBC #/AREA URNS HPF: ABNORMAL /HPF
SODIUM SERPL-SCNC: 137 MMOL/L (ref 136–145)
SP GR UR STRIP: <=1.005
SQUAMOUS #/AREA URNS LPF: ABNORMAL /LPF
UROBILINOGEN UR STRIP-ACNC: 0.2 MG/DL
WBC # BLD AUTO: 10.93 K/UL (ref 4.5–11)
WBC #/AREA URNS HPF: ABNORMAL /HPF

## 2023-02-13 PROCEDURE — 25500020 PHARM REV CODE 255: Performed by: NURSE PRACTITIONER

## 2023-02-13 PROCEDURE — 85025 COMPLETE CBC W/AUTO DIFF WBC: CPT | Performed by: NURSE PRACTITIONER

## 2023-02-13 PROCEDURE — 83690 ASSAY OF LIPASE: CPT | Performed by: NURSE PRACTITIONER

## 2023-02-13 PROCEDURE — 99284 PR EMERGENCY DEPT VISIT,LEVEL IV: ICD-10-PCS | Mod: ,,, | Performed by: NURSE PRACTITIONER

## 2023-02-13 PROCEDURE — 80053 COMPREHEN METABOLIC PANEL: CPT | Performed by: NURSE PRACTITIONER

## 2023-02-13 PROCEDURE — 96375 TX/PRO/DX INJ NEW DRUG ADDON: CPT

## 2023-02-13 PROCEDURE — 96361 HYDRATE IV INFUSION ADD-ON: CPT

## 2023-02-13 PROCEDURE — 96374 THER/PROPH/DIAG INJ IV PUSH: CPT

## 2023-02-13 PROCEDURE — 99284 EMERGENCY DEPT VISIT MOD MDM: CPT | Mod: ,,, | Performed by: NURSE PRACTITIONER

## 2023-02-13 PROCEDURE — 99285 EMERGENCY DEPT VISIT HI MDM: CPT | Mod: 25

## 2023-02-13 PROCEDURE — 83605 ASSAY OF LACTIC ACID: CPT | Performed by: NURSE PRACTITIONER

## 2023-02-13 PROCEDURE — 81001 URINALYSIS AUTO W/SCOPE: CPT | Performed by: NURSE PRACTITIONER

## 2023-02-13 PROCEDURE — 63600175 PHARM REV CODE 636 W HCPCS: Performed by: NURSE PRACTITIONER

## 2023-02-13 RX ORDER — DIPHENHYDRAMINE HYDROCHLORIDE 50 MG/ML
25 INJECTION INTRAMUSCULAR; INTRAVENOUS
Status: COMPLETED | OUTPATIENT
Start: 2023-02-13 | End: 2023-02-13

## 2023-02-13 RX ORDER — METHYLPREDNISOLONE SOD SUCC 125 MG
125 VIAL (EA) INJECTION
Status: COMPLETED | OUTPATIENT
Start: 2023-02-13 | End: 2023-02-13

## 2023-02-13 RX ORDER — ONDANSETRON 2 MG/ML
4 INJECTION INTRAMUSCULAR; INTRAVENOUS
Status: COMPLETED | OUTPATIENT
Start: 2023-02-13 | End: 2023-02-13

## 2023-02-13 RX ADMIN — IOPAMIDOL 100 ML: 755 INJECTION, SOLUTION INTRAVENOUS at 04:02

## 2023-02-13 RX ADMIN — METHYLPREDNISOLONE SODIUM SUCCINATE 125 MG: 125 INJECTION, POWDER, FOR SOLUTION INTRAMUSCULAR; INTRAVENOUS at 03:02

## 2023-02-13 RX ADMIN — DIPHENHYDRAMINE HYDROCHLORIDE 25 MG: 50 INJECTION INTRAMUSCULAR; INTRAVENOUS at 03:02

## 2023-02-13 RX ADMIN — ONDANSETRON 4 MG: 2 INJECTION INTRAMUSCULAR; INTRAVENOUS at 03:02

## 2023-02-13 RX ADMIN — SODIUM CHLORIDE, POTASSIUM CHLORIDE, SODIUM LACTATE AND CALCIUM CHLORIDE 1000 ML: 600; 310; 30; 20 INJECTION, SOLUTION INTRAVENOUS at 03:02

## 2023-02-13 NOTE — ED PROVIDER NOTES
Encounter Date: 2/13/2023       History     Chief Complaint   Patient presents with    Vomiting    Constipation     Tejas Vizcarra is a 22 y.o. Black or  /male presenting to ED via EMS with abdominal pain, constipation and vomiting for 2 days. He was seen in ED yesterday and diagnosed with Constipation and Gastroenteritis. He was given script for Miralax and Zofran. States that Miralax caused vomiting even with Zofran use so he was unable to complete a full dose. He reports small, hard BM this morning. Currently reports nausea but no vomiting since this AM when he attempted to drink Miralax. Currently in NAD. Low grade temp. Last took OTC Tylenol last PM. Otherwise, VSS at this time.      The history is provided by the patient.   Review of patient's allergies indicates:   Allergen Reactions    Iodine Swelling    Shrimp      History reviewed. No pertinent past medical history.  History reviewed. No pertinent surgical history.  History reviewed. No pertinent family history.  Social History     Tobacco Use    Smoking status: Every Day     Packs/day: 0.50     Types: Cigarettes    Smokeless tobacco: Never   Substance Use Topics    Alcohol use: Not Currently    Drug use: Not Currently     Review of Systems   Constitutional:  Positive for appetite change, chills and fatigue. Negative for fever.   HENT:  Negative for congestion, rhinorrhea and sore throat.    Respiratory:  Negative for cough and shortness of breath.    Cardiovascular:  Negative for chest pain.   Gastrointestinal:  Positive for abdominal pain, constipation, nausea and vomiting. Negative for abdominal distention, blood in stool and rectal pain.   Genitourinary:  Positive for decreased urine volume. Negative for dysuria and frequency.   Musculoskeletal:  Negative for arthralgias and myalgias.   Neurological:  Negative for dizziness, syncope and light-headedness.   All other systems reviewed and are negative.    Physical Exam      Initial Vitals [02/13/23 1452]   BP Pulse Resp Temp SpO2   114/66 85 18 99.5 °F (37.5 °C) 99 %      MAP       --         Physical Exam    Nursing note and vitals reviewed.  Constitutional: He appears well-developed and well-nourished. No distress.   HENT:   Mouth/Throat: Oropharynx is clear and moist.   Eyes: Conjunctivae are normal. Pupils are equal, round, and reactive to light. No scleral icterus.   Neck: Neck supple.   Normal range of motion.  Cardiovascular:  Normal rate, regular rhythm, normal heart sounds and intact distal pulses.           Pulmonary/Chest: Breath sounds normal. No respiratory distress.   Abdominal: Abdomen is soft and flat. He exhibits no distension. Bowel sounds are increased. There is abdominal tenderness in the right lower quadrant and left upper quadrant. No hernia.   No right CVA tenderness.  No left CVA tenderness. There is rebound and tenderness at McBurney's point. There is no guarding and negative Sandhu's sign. negative obturator sign and negative psoas sign  Musculoskeletal:         General: No tenderness. Normal range of motion.      Cervical back: Normal range of motion and neck supple.     Neurological: He is alert and oriented to person, place, and time. GCS score is 15. GCS eye subscore is 4. GCS verbal subscore is 5. GCS motor subscore is 6.   Skin: Skin is warm and dry. Capillary refill takes less than 2 seconds.   Psychiatric: He has a normal mood and affect.       Medical Screening Exam   See Full Note    ED Course   Procedures  Labs Reviewed   COMPREHENSIVE METABOLIC PANEL - Abnormal; Notable for the following components:       Result Value    Potassium 3.4 (*)     BUN 5 (*)     BUN/Creatinine Ratio 5 (*)     All other components within normal limits   LIPASE - Abnormal; Notable for the following components:    Lipase 48 (*)     All other components within normal limits   CBC WITH DIFFERENTIAL - Abnormal; Notable for the following components:    RBC 4.28 (*)      Hematocrit 39.7 (*)     MCH 32.7 (*)     RDW 11.2 (*)     Neutrophils % 81.0 (*)     Lymphocytes % 8.6 (*)     Neutrophils, Abs 8.86 (*)     Lymphocytes, Absolute 0.94 (*)     Monocytes % 9.8 (*)     Eosinophils % 0.4 (*)     Monocytes, Absolute 1.07 (*)     All other components within normal limits   URINALYSIS, REFLEX TO URINE CULTURE - Abnormal; Notable for the following components:    Ketones, UA 40 (*)     Blood, UA Trace-Intact (*)     All other components within normal limits   URINALYSIS, MICROSCOPIC - Abnormal; Notable for the following components:    RBC, UA 3-5 (*)     All other components within normal limits   LACTIC ACID, PLASMA - Normal   CBC W/ AUTO DIFFERENTIAL    Narrative:     The following orders were created for panel order CBC auto differential.  Procedure                               Abnormality         Status                     ---------                               -----------         ------                     CBC with Differential[[id number]]        Abnormal            Final result                 Please view results for these tests on the individual orders.          Imaging Results              CT Abdomen Pelvis With Contrast (Final result)  Result time 02/13/23 17:22:12      Final result by Yordy Bradford MD (02/13/23 17:22:12)                   Impression:      There is some equivocal mild wall thickening of the ascending colon as can be seen with some nonspecific mild colitis.  Evaluation is limited by the lack of colonic distention.    Otherwise, no definite acute process.  Appendix appears normal.  There is bilateral renal excretion without hydronephrosis.      Electronically signed by: Yordy Bradford  Date:    02/13/2023  Time:    17:22               Narrative:    EXAMINATION:  CT ABDOMEN AND PELVIS with IV contrast    CLINICAL HISTORY:  Right lower quadrant abdominal pain.  Vomiting.  Constipation    TECHNIQUE:  Axial CT images were obtained through the abdomen and pelvis  following IV administration of 100 mL of Isovue 370 contrast. Oral contrast was not given.  Coronal and sagittal reconstructions submitted and interpreted.  Total  mGycm.  Automated exposure control utilized.    COMPARISON:  No previous similar study available    FINDINGS:  CT abdomen:    Partially visualized lung bases are clear.  There is no gross pleural or pericardial effusion.    There is no evidence of pneumoperitoneum.    Liver, bile ducts, pancreas, spleen, adrenal glands, gallbladder, and kidneys appear normal.  There is bilateral renal excretion without hydronephrosis.    Stomach is not well distended but is generally unremarkable.  Appendix appears normal.  There is no gloria bowel obstruction.  There is some equivocal mild wall thickening of the ascending colon, though evaluation is limited by the lack of distention.    CT pelvis:    Urinary bladder is moderately distended.  No pelvic mass is seen.    There is no acute osseous abnormality    No acute osseous findings.                                       Medications   lactated ringers bolus 1,000 mL (0 mLs Intravenous Stopped 2/13/23 1612)   ondansetron injection 4 mg (4 mg Intravenous Given 2/13/23 1509)   methylPREDNISolone sodium succinate injection 125 mg (125 mg Intravenous Given 2/13/23 1547)   diphenhydrAMINE injection 25 mg (25 mg Intravenous Given 2/13/23 1547)   iopamidoL (ISOVUE-370) injection 100 mL (100 mLs Intravenous Given 2/13/23 1645)     Medical Decision Making:   History:   Old Medical Records: I decided to obtain old medical records.  Old Records Summarized: other records.       <> Summary of Records: ED visit from 2/12/23- 1 L NS bolus, Zofran IVP, KUB- mild colonic stool. CBC unremarkable. COVID/Flu negative. Dx constipation and gastroenteritis. D/c home with script for Zofran and Miralax.  Initial Assessment:   Tejas Vizcarra is a 22 y.o. Black or  /male presenting to ED via EMS with abdominal  pain, constipation and vomiting for 2 days. He was seen in ED yesterday and diagnosed with Constipation and Gastroenteritis. He was given script for Miralax and Zofran. States that Miralax caused vomiting even with Zofran use so he was unable to complete a full dose. He reports small, hard BM this morning. Currently reports nausea but no vomiting since this AM when he attempted to drink Miralax. Currently in NAD. Low grade temp. Last took OTC Tylenol last PM. Otherwise, VSS at this time.    Differential Diagnosis:   Constipation   SBO  Appendicitis  Clinical Tests:   Lab Tests: Ordered and Reviewed  The following lab test(s) were unremarkable: CBC, CMP, Lactate, Lipase and Urinalysis       <> Summary of Lab: CBC- unremarkable. WBC unchanged since yesterday  CMP- Potassium 3.4, otherwise unremarkable  Lipase- 48  Lactate- 1.6  Urinalysis- trace occult, 40 Ketones. Otherwise unremarkable  CT abdomen/pelvis w/ contrast- There is some equivocal mild wall thickening of the ascending colon as can be seen with some nonspecific mild colitis.  Evaluation is limited by the lack of colonic distention.  Radiological Study: Ordered and Reviewed  ED Management:  LR 1 L bolus  Zofran 4 mg IV- nausea subsided  Premedicated for contrasted CT with Solumedrol and Benadryl d/t reported allergy to iodine  PO challenge- josue well without nausea or vomiting  Tejas Vizcarra likely has acute enteritis. Able to take down POs. No indication for antibiotics or further studies at this time.    Given the large differential diagnosis for Tejas Vizcarra, the decision making in this case is of high complexity.    After evaluating all of the data points in this case, the presentation of Lucilleus [place] Vizcarra is NOT consistent with AAA; Mesenteric Ischemia; Bowel Perforation; Bowel Obstruction; Sigmoid Volvulus; Diverticulitis; Appendicitis; Peritonitis; Cholecystitis, ascending cholangitis or other gallbladder disease;  perforated ulcer; significant GI bleeding, splenic rupture/infarction; Hepatic abscess; or other surgical/acute abdomen.    Similarly, this presentation is NOT consistent with ACS or Myocardial Ischemia or cardiac etiology; Pulmonary Embolism; fistula; incarcerated hernia; Pancreatitis, Aortic Dissection; Diabetic Ketoacidosis; Kidney Stone; Ischemic colitis; Psoas or other abscess; Methanol poisoning; Heavy metal toxicity; or porphyria.      Similarly, this presentation is NOT consistent with acute coronary syndrome, pulmonary embolism, dissection, borhaave's, arrythmia, pneumothorax, cardiac tamponade, or other emergent cardiopulmonary condition.    Similarly, this presentation is NOT consistent with sepsis, pyelonephritis, urinary infection, pneumonia, or other focal bacterial infection.    Strict return and follow-up precautions have been given by me personally to the patient/family/caregiver(s).    Data Reviewed/Counseling: I have reviewed the patient's vital signs, nursing notes, and other relevant tests/information. I had a detailed discussion regarding the historical points, exam findings, and any diagnostic results supporting the discharge diagnosis. I also discussed the need for outpatient follow-up and the need to return to the ED if symptoms worsen or if there are any questions or concerns that arise at home.     Dx: Constipation           ED Course as of 02/13/23 1818   Mon Feb 13, 2023 1736 Nausea has subsided. No vomiting while in ED. Patient reports that he feels better. Able to josue PO challenge. Discussed results. Patient is in agreement with plan to d/c home. V/u of all discharge instructions. No questions or concerns at this time. [LP]      ED Course User Index  [LP] ISMAEL Amezquita          Clinical Impression:   Final diagnoses:  [K59.00] Constipation, unspecified constipation type (Primary)        ED Disposition Condition    Discharge Stable          ED Prescriptions    None        Follow-up Information    None          Irlanda West, [facility]  02/13/23 6031

## 2023-02-13 NOTE — DISCHARGE INSTRUCTIONS
Follow up with primary care provider in 2-3 days for re-evaluation.  Increase fiber in diet.   Increase fluid intake.   If you are unable to tolerate Miralax, use enema. You can obtain this over the counter at any pharmacy or store.  Return to emergency department for any new or worsening symptoms or any future emergencies.

## 2023-02-13 NOTE — Clinical Note
"Liquandetricus "Liquandetricus" Zackery was seen and treated in our emergency department on 2/13/2023.  He may return to work on 02/14/2023.       If you have any questions or concerns, please don't hesitate to call.      ISMAEL Amezquita"

## 2023-02-13 NOTE — Clinical Note
Mary Cody accompanied their family member to the emergency department on 2/13/2023. They may return to work on 02/14/2023.      If you have any questions or concerns, please don't hesitate to call.      ISMAEL Amezquita

## 2023-02-13 NOTE — ED TRIAGE NOTES
22 yr old male presents to er with complaints with vomiting and constipation pt was seen yesterday. Pt states he tried to take the miralax and it make him sick. He did have a hard bm today